# Patient Record
Sex: FEMALE | Race: BLACK OR AFRICAN AMERICAN | Employment: UNEMPLOYED | URBAN - METROPOLITAN AREA
[De-identification: names, ages, dates, MRNs, and addresses within clinical notes are randomized per-mention and may not be internally consistent; named-entity substitution may affect disease eponyms.]

---

## 2019-01-01 ENCOUNTER — APPOINTMENT (OUTPATIENT)
Dept: CT IMAGING | Age: 54
End: 2019-01-01
Attending: PHYSICIAN ASSISTANT
Payer: SELF-PAY

## 2019-01-01 ENCOUNTER — HOSPITAL ENCOUNTER (EMERGENCY)
Age: 54
Discharge: HOME | End: 2019-01-01
Attending: EMERGENCY MEDICINE
Payer: SELF-PAY

## 2019-01-01 VITALS
HEART RATE: 71 BPM | HEIGHT: 62 IN | OXYGEN SATURATION: 100 % | WEIGHT: 174.82 LBS | BODY MASS INDEX: 32.17 KG/M2 | RESPIRATION RATE: 16 BRPM | SYSTOLIC BLOOD PRESSURE: 153 MMHG | DIASTOLIC BLOOD PRESSURE: 110 MMHG | TEMPERATURE: 97.7 F

## 2019-01-01 DIAGNOSIS — Z98.890 HISTORY OF HERNIA REPAIR: ICD-10-CM

## 2019-01-01 DIAGNOSIS — A59.9 TRICHOMONIASIS: ICD-10-CM

## 2019-01-01 DIAGNOSIS — R10.2 PELVIC PAIN: Primary | ICD-10-CM

## 2019-01-01 DIAGNOSIS — D50.9 IRON DEFICIENCY ANEMIA, UNSPECIFIED IRON DEFICIENCY ANEMIA TYPE: ICD-10-CM

## 2019-01-01 DIAGNOSIS — Z87.19 HISTORY OF HERNIA REPAIR: ICD-10-CM

## 2019-01-01 DIAGNOSIS — R03.0 ELEVATED BLOOD PRESSURE READING: ICD-10-CM

## 2019-01-01 DIAGNOSIS — N83.202 CYST OF LEFT OVARY: ICD-10-CM

## 2019-01-01 DIAGNOSIS — Z20.2 POSSIBLE EXPOSURE TO STD: ICD-10-CM

## 2019-01-01 LAB
ANION GAP SERPL CALC-SCNC: 7 MMOL/L (ref 5–15)
APPEARANCE UR: CLEAR
BACTERIA URNS QL MICRO: ABNORMAL /HPF
BILIRUB UR QL: NEGATIVE
BUN SERPL-MCNC: 14 MG/DL (ref 6–20)
BUN/CREAT SERPL: 17 (ref 12–20)
CALCIUM SERPL-MCNC: 8.4 MG/DL (ref 8.5–10.1)
CHLORIDE SERPL-SCNC: 111 MMOL/L (ref 97–108)
CLUE CELLS VAG QL WET PREP: NORMAL
CO2 SERPL-SCNC: 25 MMOL/L (ref 21–32)
COLOR UR: ABNORMAL
CREAT SERPL-MCNC: 0.83 MG/DL (ref 0.55–1.02)
EPITH CASTS URNS QL MICRO: ABNORMAL /LPF
ERYTHROCYTE [DISTWIDTH] IN BLOOD BY AUTOMATED COUNT: 19.7 % (ref 11.5–14.5)
GLUCOSE SERPL-MCNC: 109 MG/DL (ref 65–100)
GLUCOSE UR STRIP.AUTO-MCNC: NEGATIVE MG/DL
HCG UR QL: NEGATIVE
HCT VFR BLD AUTO: 32.4 % (ref 35–47)
HGB BLD-MCNC: 10.2 G/DL (ref 11.5–16)
HGB UR QL STRIP: NEGATIVE
KETONES UR QL STRIP.AUTO: NEGATIVE MG/DL
KOH PREP SPEC: NORMAL
LEUKOCYTE ESTERASE UR QL STRIP.AUTO: NEGATIVE
LIPASE SERPL-CCNC: 102 U/L (ref 73–393)
MCH RBC QN AUTO: 23.9 PG (ref 26–34)
MCHC RBC AUTO-ENTMCNC: 31.5 G/DL (ref 30–36.5)
MCV RBC AUTO: 76.1 FL (ref 80–99)
NITRITE UR QL STRIP.AUTO: NEGATIVE
NRBC # BLD: 0 K/UL (ref 0–0.01)
NRBC BLD-RTO: 0 PER 100 WBC
PH UR STRIP: 6.5 [PH] (ref 5–8)
PLATELET # BLD AUTO: 313 K/UL (ref 150–400)
PMV BLD AUTO: 11 FL (ref 8.9–12.9)
POTASSIUM SERPL-SCNC: 3.9 MMOL/L (ref 3.5–5.1)
PROT UR STRIP-MCNC: NEGATIVE MG/DL
RBC # BLD AUTO: 4.26 M/UL (ref 3.8–5.2)
RBC #/AREA URNS HPF: ABNORMAL /HPF (ref 0–5)
SERVICE CMNT-IMP: NORMAL
SODIUM SERPL-SCNC: 143 MMOL/L (ref 136–145)
SP GR UR REFRACTOMETRY: 1.02 (ref 1–1.03)
T VAGINALIS VAG QL WET PREP: NORMAL
TRICHOMONAS UR QL MICRO: PRESENT
UA: UC IF INDICATED,UAUC: ABNORMAL
UROBILINOGEN UR QL STRIP.AUTO: 0.2 EU/DL (ref 0.2–1)
WBC # BLD AUTO: 3.3 K/UL (ref 3.6–11)
WBC URNS QL MICRO: ABNORMAL /HPF (ref 0–4)

## 2019-01-01 PROCEDURE — 87086 URINE CULTURE/COLONY COUNT: CPT

## 2019-01-01 PROCEDURE — 74011250636 HC RX REV CODE- 250/636: Performed by: EMERGENCY MEDICINE

## 2019-01-01 PROCEDURE — 85027 COMPLETE CBC AUTOMATED: CPT

## 2019-01-01 PROCEDURE — 99283 EMERGENCY DEPT VISIT LOW MDM: CPT

## 2019-01-01 PROCEDURE — 87491 CHLMYD TRACH DNA AMP PROBE: CPT

## 2019-01-01 PROCEDURE — 87210 SMEAR WET MOUNT SALINE/INK: CPT

## 2019-01-01 PROCEDURE — 80048 BASIC METABOLIC PNL TOTAL CA: CPT

## 2019-01-01 PROCEDURE — 83690 ASSAY OF LIPASE: CPT

## 2019-01-01 PROCEDURE — 74011636320 HC RX REV CODE- 636/320: Performed by: EMERGENCY MEDICINE

## 2019-01-01 PROCEDURE — 81001 URINALYSIS AUTO W/SCOPE: CPT

## 2019-01-01 PROCEDURE — 74177 CT ABD & PELVIS W/CONTRAST: CPT

## 2019-01-01 PROCEDURE — 36415 COLL VENOUS BLD VENIPUNCTURE: CPT

## 2019-01-01 PROCEDURE — 81025 URINE PREGNANCY TEST: CPT

## 2019-01-01 PROCEDURE — 74011250637 HC RX REV CODE- 250/637: Performed by: PHYSICIAN ASSISTANT

## 2019-01-01 RX ORDER — DICYCLOMINE HYDROCHLORIDE 20 MG/1
20 TABLET ORAL EVERY 6 HOURS
Qty: 20 TAB | Refills: 0 | Status: SHIPPED | OUTPATIENT
Start: 2019-01-01 | End: 2019-01-06

## 2019-01-01 RX ORDER — DOXYCYCLINE HYCLATE 100 MG
100 TABLET ORAL 2 TIMES DAILY
Qty: 14 TAB | Refills: 0 | Status: SHIPPED | OUTPATIENT
Start: 2019-01-01 | End: 2019-01-08

## 2019-01-01 RX ORDER — SODIUM CHLORIDE 0.9 % (FLUSH) 0.9 %
10 SYRINGE (ML) INJECTION
Status: COMPLETED | OUTPATIENT
Start: 2019-01-01 | End: 2019-01-01

## 2019-01-01 RX ORDER — SODIUM CHLORIDE 9 MG/ML
50 INJECTION, SOLUTION INTRAVENOUS
Status: COMPLETED | OUTPATIENT
Start: 2019-01-01 | End: 2019-01-01

## 2019-01-01 RX ORDER — METRONIDAZOLE 250 MG/1
2000 TABLET ORAL
Status: COMPLETED | OUTPATIENT
Start: 2019-01-01 | End: 2019-01-01

## 2019-01-01 RX ADMIN — IOPAMIDOL 100 ML: 755 INJECTION, SOLUTION INTRAVENOUS at 13:59

## 2019-01-01 RX ADMIN — Medication 10 ML: at 13:59

## 2019-01-01 RX ADMIN — SODIUM CHLORIDE 50 ML/HR: 900 INJECTION, SOLUTION INTRAVENOUS at 13:59

## 2019-01-01 RX ADMIN — METRONIDAZOLE 2000 MG: 250 TABLET ORAL at 14:20

## 2019-01-01 NOTE — ED PROVIDER NOTES
EMERGENCY DEPARTMENT HISTORY AND PHYSICAL EXAM 
 
 
Date: 1/1/2019 Patient Name: Christopher Cabrales History of Presenting Illness Chief Complaint Patient presents with  Vaginal Bleeding Pt reoprts onset of irregular vaginal bleeding over the past 4 weeks with lower abdominal pain. History Provided By: Patient HPI: Christopher Cabrales, 47 y.o. female with PMHx significant for HTN and \"ruptured hernia\" requiring emergent surgery two years ago, presents ambulatry to the ED with cc of diffuse abdominal pain x 1 month with associated vaginal bleeding. Pt states that her bleeding began the day after Thanksgiving and lasted one week, then stopped x 4 days, but then resumed. She states that she is sometimes spotting and other times it is heavy bleeding. She is using about four pads daily. She states that her bleeding stopped four days ago and she has not experienced recurrence. She states that she has not yet gone through menopause yet. Her mother went through menopause around 61years old. Pt reports that her abdominal pain is in the middle and lower areas. She denies any urinary symptoms. She reports unprotected sexual intercourse with her fiancee. She denies vaginal discharge, diarrhea, N/V, hemoptysis, melena, hematochezia. PCP: Ric Nicole MD 
 
There are no other complaints, changes, or physical findings at this time. Current Outpatient Medications Medication Sig Dispense Refill  doxycycline (VIBRA-TABS) 100 mg tablet Take 1 Tab by mouth two (2) times a day for 7 days. 14 Tab 0  
 dicyclomine (BENTYL) 20 mg tablet Take 1 Tab by mouth every six (6) hours for 20 doses. 20 Tab 0  
 ciprofloxacin HCl (CILOXAN) 0.3 % ophthalmic solution Administer 1 drop to both eyes four (4) times daily. 2.5 mL 0  
 FEXOFENADINE HCL (ALLEGRA PO) Take  by mouth daily.     
 ferrous sulfate (IRON) 325 mg (65 mg elemental iron) tablet Take  by mouth three (3) times daily (with meals). Past History Past Medical History: 
Past Medical History:  
Diagnosis Date  Elevated blood pressure  Heart murmur Past Surgical History: 
Past Surgical History:  
Procedure Laterality Date [facility]    
 2011  HX  SECTION    
 HX HEENT    
 sinus surgery  HX ORTHOPAEDIC    
 left ring finger trigger release  HX WISDOM TEETH EXTRACTION Family History: 
Family History Problem Relation Age of Onset  Hypertension Mother  Stroke Mother  Cancer Maternal Grandmother Breast Cancer  Diabetes Maternal Grandmother  Diabetes Paternal Grandmother Social History: 
Social History Tobacco Use  Smoking status: Never Smoker  Smokeless tobacco: Never Used Substance Use Topics  Alcohol use: Yes  Drug use: No  
 
 
Allergies: Allergies Allergen Reactions  Latex, Natural Rubber Anaphylaxis  Dolobid [Diflunisal] Anaphylaxis  Penicillin G Anaphylaxis Review of Systems Review of Systems Constitutional: Negative. Negative for activity change, appetite change, chills and fever. Eyes: Negative for pain and visual disturbance. Respiratory: Negative for cough and shortness of breath. Cardiovascular: Negative for chest pain and palpitations. Gastrointestinal: Positive for abdominal pain (diffuse). Negative for diarrhea, nausea and vomiting. Genitourinary: Positive for vaginal bleeding (now resolved). Negative for dysuria and hematuria. Musculoskeletal: Negative for arthralgias and myalgias. Skin: Negative for color change, rash and wound. Neurological: Negative for dizziness and headaches. All other systems reviewed and are negative. Physical Exam  
Physical Exam  
Constitutional: She is oriented to person, place, and time. She appears well-developed and well-nourished. No distress. 47 y.o.   female in NAD 
 Communicates appropriately and in full sentences HENT:  
Head: Normocephalic and atraumatic. Mouth/Throat: Oropharynx is clear and moist.  
Eyes: Conjunctivae are normal. Pupils are equal, round, and reactive to light. Right eye exhibits no discharge. Left eye exhibits no discharge. Neck: Normal range of motion. Neck supple. No nuchal rigidity or meningeal signs Cardiovascular: Normal rate, regular rhythm and intact distal pulses. Pulmonary/Chest: Effort normal and breath sounds normal. No respiratory distress. Abdominal: Soft. She exhibits no distension. There is tenderness (diffuse). There is no rebound and no guarding. Genitourinary:  
Genitourinary Comments: Defers pelvic exam and prefers to self swab Musculoskeletal: Normal range of motion. She exhibits no edema, tenderness or deformity. No neurologic, motor, vascular, or compartment embarrassment observed on exam. No focal neurologic deficits. Neurological: She is alert and oriented to person, place, and time. Skin: Skin is warm and dry. No rash noted. She is not diaphoretic. No erythema. No pallor. Psychiatric: She has a normal mood and affect. Her behavior is normal.  
Nursing note and vitals reviewed. Diagnostic Study Results Labs - No results found for this or any previous visit (from the past 12 hour(s)). Radiologic Studies -  
CT ABD PELV W CONT Final Result IMPRESSION: No bowel obstruction, ileus or perforation. No intraabdominal  
abscess. CT Results  (Last 48 hours) 01/01/19 1359  CT ABD PELV W CONT Final result Impression:  IMPRESSION: No bowel obstruction, ileus or perforation. No intraabdominal  
abscess. Narrative:  INDICATION: Vaginal bleeding for 4 weeks, 3-4 days ago. Lower abdominal pain. CT of the abdomen and pelvis is performed with 5 mm collimation. Study is  
performed with 100 cc of nonionic Isovue 370. Sagittal and coronal reformatted images were also performed. CT dose reduction was achieved with the use of the standardized protocol  
tailored for this examination and automatic exposure control for dose  
modulation. There is no prior study for direct comparison. Findings:  
   
Lung bases: There is very mild atelectasis within the medial lung bases. Liver: The liver is normal.  
   
Adrenals: Adrenal glands are normal.  
   
Pancreas: The pancreas is normal.  
   
Gallbladder: The gallbladder is normal.  
   
Kidneys: There is multifocal right renal cortical scarring. Kidneys otherwise  
enhance prominent symmetrically. There is no hydronephrosis. Spleen: The spleen is normal.  
   
Lymph nodes. There is no dayne hepatitis, mesenteric, retroperitoneal or pelvic  
lymphadenopathy. Bowel: No thickened or dilated loop of large or small bowel is visualized. Appendix: The appendix is not visualized, however there are no secondary signs  
of acute appendicitis. Urinary bladder: Urinary bladder is partially filled and grossly normal.  
   
Miscellaneous: Within the right upper pelvis, there is a 2.7 cm x 2.6 cm rounded  
thin-walled cystic lesion, likely arising from the right ovary. There is no free  
intraperitoneal fluid or gas. There is no focal fluid collection to suggest  
abscess. Bilateral tubal ligation clips are noted. Medical Decision Making I am the first provider for this patient. I reviewed the vital signs, available nursing notes, past medical history, past surgical history, family history and social history. Vital Signs-Reviewed the patient's vital signs. No data found. Records Reviewed: Nursing Notes and Old Medical Records Provider Notes (Medical Decision Making): DDx: UTI, hernia, menopause, dysmenorrhea, hormone changes, trichomonas, BV, GC/CT 
 
ED Course:  
Initial assessment performed.  The patients presenting problems have been discussed, and they are in agreement with the care plan formulated and outlined with them. I have encouraged them to ask questions as they arise throughout their visit. HYPERTENSION COUNSELING Patient denies chest pain, headache, shortness of breath. Patient is made aware of their elevated blood pressure and is instructed to follow up this week with their PCP for a blood pressure recheck. Patient is counseled regarding consequences of chronic, uncontrolled hypertension including kidney disease, vision disturbances, heart disease, stroke or even death. Patient states their understanding and agrees to follow up this week. DISCHARGE NOTE: 
Kwesi Abernathy's  results have been reviewed with her. She has been counseled regarding her diagnosis. She verbally conveys understanding and agreement of the signs, symptoms, diagnosis, treatment and prognosis and additionally agrees to follow up as recommended with Dr. Raj Salinas MD in 24 - 48 hours. She also agrees with the care-plan and conveys that all of her questions have been answered. I have also put together some discharge instructions for her that include: 1) educational information regarding their diagnosis, 2) how to care for their diagnosis at home, as well a 3) list of reasons why they would want to return to the ED prior to their follow-up appointment, should their condition change. She and/or family's questions have been answered. I have encouraged them to see the official results in [facility]\" or to retrieve the specifics of their results from medical records. PLAN: 
1. Return precautions as discussed 2. Follow-up with providers as directed 3. Medications as prescribed Return to ED if worse Diagnosis Clinical Impression: 1. Pelvic pain 2. Elevated blood pressure reading 3. History of hernia repair 4. Trichomoniasis 5.  Possible exposure to STD   
 6. Iron deficiency anemia, unspecified iron deficiency anemia type 7. Cyst of left ovary Discharge Medication List as of 1/1/2019  3:26 PM  
  
START taking these medications Details  
doxycycline (VIBRA-TABS) 100 mg tablet Take 1 Tab by mouth two (2) times a day for 7 days. , Normal, Disp-14 Tab, R-0  
  
dicyclomine (BENTYL) 20 mg tablet Take 1 Tab by mouth every six (6) hours for 20 doses. , Normal, Disp-20 Tab, R-0  
  
  
CONTINUE these medications which have NOT CHANGED Details  
ciprofloxacin HCl (CILOXAN) 0.3 % ophthalmic solution Administer 1 drop to both eyes four (4) times daily. , Print, Disp-2.5 mL, R-0 FEXOFENADINE HCL (ALLEGRA PO) Take  by mouth daily. Historical Med  
  
ferrous sulfate (IRON) 325 mg (65 mg elemental iron) tablet Take  by mouth three (3) times daily (with meals). Historical Med Follow-up Information Follow up With Specialties Details Why Contact Info Daniel Correa MD Family Practice Schedule an appointment as soon as possible for a visit in 2 days As needed, If symptoms worsen [address] [facility] [id number] [phone] [facility] EMERGENCY DEPT Emergency Medicine Go to If symptoms worsen [address] [facility] 
[phone] Mariola Eason MD Obstetrics & Gynecology, Gynecology, Obstetrics Call today  [address] [facility] 
[phone] This note will not be viewable in [address]

## 2019-01-01 NOTE — LETTER
[address] 
[facility] EMERGENCY DEPT 
[address] [place] [address] [address] [id number] [phone] Work/School Note Date: 1/1/2019 To Whom It May concern: 
 
Radha Guerrier was seen and treated today in the emergency room by the following provider(s): 
Attending Provider: Scarlett Dave MD 
Physician Assistant: DARVIN Sexton. Radha Guerrier may return to work on 1/3/2019. Sincerely, 
 
 
 
 
Dora Beckford.  [address] [place] [address]

## 2019-01-01 NOTE — ED NOTES
Discharge paperwork given to pt by provider. No acute changes noted to pt. Pt stable and ambulatory upon discharge.

## 2019-01-01 NOTE — ED NOTES
Pt presents with c/o mid abdominal pain radiating to lower back. Denies nausea/vomiting. Pt also states she has had vaginal bleeding since thanksgiving and bleeding stopped 3-4 days ago. Pt currently denies dizziness, SOB.

## 2019-01-01 NOTE — DISCHARGE INSTRUCTIONS
Iron Deficiency Anemia: Care Instructions  Your Care Instructions    Anemia means that you do not have enough red blood cells. Red blood cells carry oxygen around your body. When you have anemia, it can make you pale, weak, and tired. Many things can cause anemia. The most common cause is loss of blood. This can happen if you have heavy menstrual periods. It can also happen if you have bleeding in your stomach or bowel. You can also get anemia if you don't have enough iron in your diet or if it's hard for your body to absorb iron. In some cases, pregnancy causes anemia. That's because a pregnant woman needs more iron. Your doctor may do more tests to find the cause of your anemia. If a disease or other health problem is causing it, your doctor will treat that problem. It's important to follow up with your doctor to make sure that your iron level returns to normal.  Follow-up care is a key part of your treatment and safety. Be sure to make and go to all appointments, and call your doctor if you are having problems. It's also a good idea to know your test results and keep a list of the medicines you take. How can you care for yourself at home? · If your doctor recommended iron pills, take them as directed. ? Try to take the pills on an empty stomach. You can do this about 1 hour before or 2 hours after meals. But you may need to take iron with food to avoid an upset stomach. ? Do not take antacids or drink milk or anything with caffeine within 2 hours of when you take your iron. They can keep your body from absorbing the iron well. ? Vitamin C helps your body absorb iron. You may want to take iron pills with a glass of orange juice or some other food high in vitamin C.  ? Iron pills may cause stomach problems. These include heartburn, nausea, diarrhea, constipation, and cramps. It can help to drink plenty of fluids and include fruits, vegetables, and fiber in your diet.   ? It's normal for iron pills to make your stool a greenish or grayish black. But internal bleeding can also cause dark stool. So it's important to tell your doctor about any color changes. ? Call your doctor if you think you are having a problem with your iron pills. Even after you start to feel better, it will take several months for your body to build up its supply of iron. ? If you miss a pill, don't take a double dose. ? Keep iron pills out of the reach of small children. Too much iron can be very dangerous. · Eat foods with a lot of iron. These include red meat, shellfish, poultry, and eggs. They also include beans, raisins, whole-grain bread, and leafy green vegetables. · Steam your vegetables. This is the best way to prepare them if you want to get as much iron as possible. · Be safe with medicines. Do not take nonsteroidal anti-inflammatory pain relievers unless your doctor tells you to. These include aspirin, naproxen (Aleve), and ibuprofen (Advil, Motrin). · Liquid iron can stain your teeth. But you can mix it with water or juice and drink it with a straw. Then it won't get on your teeth. When should you call for help? Call 911 anytime you think you may need emergency care. For example, call if:    · You passed out (lost consciousness).    Call your doctor now or seek immediate medical care if:    · You are short of breath.     · You are dizzy or light-headed, or you feel like you may faint.     · You have new or worse bleeding.    Watch closely for changes in your health, and be sure to contact your doctor if:    · You feel weaker or more tired than usual.     · You do not get better as expected. Where can you learn more? Go to http://serge-delta.info/. Enter H583 in the search box to learn more about \"Iron Deficiency Anemia: Care Instructions. \"  Current as of: May 7, 2018  Content Version: 11.8  © 1243-0617 [employer].  Care instructions adapted under license by [employer] (which disclaims liability or warranty for this information). If you have questions about a medical condition or this instruction, always ask your healthcare professional. [address] any warranty or liability for your use of this information. Elevated Blood Pressure: Care Instructions  Your Care Instructions    Blood pressure is a measure of how hard the blood pushes against the walls of your arteries. It's normal for blood pressure to go up and down throughout the day. But if it stays up over time, you have high blood pressure. Two numbers tell you your blood pressure. The first number is the systolic pressure. It shows how hard the blood pushes when your heart is pumping. The second number is the diastolic pressure. It shows how hard the blood pushes between heartbeats, when your heart is relaxed and filling with blood. An ideal blood pressure in adults is less than 120/80 (say \"120 over 80\"). High blood pressure is 140/90 or higher. You have high blood pressure if your top number is 140 or higher or your bottom number is 90 or higher, or both. The main test for high blood pressure is simple, fast, and painless. To diagnose high blood pressure, your doctor will test your blood pressure at different times. After testing your blood pressure, your doctor may ask you to test it again when you are home. If you are diagnosed with high blood pressure, you can work with your doctor to make a long-term plan to manage it. Follow-up care is a key part of your treatment and safety. Be sure to make and go to all appointments, and call your doctor if you are having problems. It's also a good idea to know your test results and keep a list of the medicines you take. How can you care for yourself at home? · Do not smoke. Smoking increases your risk for heart attack and stroke. If you need help quitting, talk to your doctor about stop-smoking programs and medicines.  These can increase your chances of quitting for good. · Stay at a healthy weight. · Try to limit how much sodium you eat to less than 2,300 milligrams (mg) a day. Your doctor may ask you to try to eat less than 1,500 mg a day. · Be physically active. Get at least 30 minutes of exercise on most days of the week. Walking is a good choice. You also may want to do other activities, such as running, swimming, cycling, or playing tennis or team sports. · Avoid or limit alcohol. Talk to your doctor about whether you can drink any alcohol. · Eat plenty of fruits, vegetables, and low-fat dairy products. Eat less saturated and total fats. · Learn how to check your blood pressure at home. When should you call for help? Call your doctor now or seek immediate medical care if:  ? · Your blood pressure is much higher than normal (such as 180/110 or higher). ? · You think high blood pressure is causing symptoms such as:  ¨ Severe headache. ¨ Blurry vision. ? Watch closely for changes in your health, and be sure to contact your doctor if:  ? · You do not get better as expected. Where can you learn more? Go to http://serge-delta.info/. Enter V733 in the search box to learn more about \"Elevated Blood Pressure: Care Instructions. \"  Current as of: September 21, 2016  Content Version: 11.4  © 3091-0791 [employer]. Care instructions adapted under license by [employer] (which disclaims liability or warranty for this information). If you have questions about a medical condition or this instruction, always ask your healthcare professional. [place] [address] any warranty or liability for your use of this information. Iron-Rich Diet: Care Instructions  Your Care Instructions    Your body needs iron to make hemoglobin. Hemoglobin is a substance in red blood cells that carries oxygen from the lungs to cells all through your body.  If you do not get enough iron, your body makes fewer and smaller red blood cells. As a result, your body's cells may not get enough oxygen. Adult men need 8 milligrams of iron a day; adult women need 18 milligrams of iron a day. After menopause, women need 8 milligrams of iron a day. A pregnant woman needs 27 milligrams of iron a day. Infants and young children have higher iron needs relative to their size than other age groups. People who have lost blood because of ulcers or heavy menstrual periods may become very low in iron and may develop anemia. Most people can get the iron their bodies need by eating enough of certain iron-rich foods. Your doctor may recommend that you take an iron supplement along with eating an iron-rich diet. Follow-up care is a key part of your treatment and safety. Be sure to make and go to all appointments, and call your doctor if you are having problems. It's also a good idea to know your test results and keep a list of the medicines you take. How can you care for yourself at home? · Make iron-rich foods a part of your daily diet. Iron-rich foods include:  ? All meats, such as chicken, beef, lamb, pork, fish, and shellfish. Liver is especially high in iron. ? Leafy green vegetables. ? Raisins, peas, beans, lentils, barley, and eggs. ? Iron-fortified breakfast cereals. · Eat foods with vitamin C along with iron-rich foods. Vitamin C helps you absorb more iron from food. Drink a glass of orange juice or another citrus juice with your food. · Eat meat and vegetables or grains together. The iron in meat helps your body absorb the iron in other foods. Where can you learn more? Go to http://serge-delta.info/. Enter [id number] in the search box to learn more about \"Iron-Rich Diet: Care Instructions. \"  Current as of: March 29, 2018  Content Version: 11.8  © 9209-7554 [employer]. Care instructions adapted under license by [employer] (which disclaims liability or warranty for this information). If you have questions about a medical condition or this instruction, always ask your healthcare professional. [address] any warranty or liability for your use of this information. Pelvic Pain: Care Instructions  Your Care Instructions    Pelvic pain, or pain in the lower belly, can have many causes. Often pelvic pain is not serious and gets better in a few days. If your pain continues or gets worse, you may need tests and treatment. Tell your doctor about any new symptoms. These may be signs of a serious problem. Follow-up care is a key part of your treatment and safety. Be sure to make and go to all appointments, and call your doctor if you are having problems. It's also a good idea to know your test results and keep a list of the medicines you take. How can you care for yourself at home? · Rest until you feel better. Lie down, and raise your legs by placing a pillow under your knees. · Drink plenty of fluids. You may find that small, frequent sips are easier on your stomach than if you drink a lot at once. Avoid drinks with carbonation or caffeine, such as soda pop, tea, or coffee. · Try eating several small meals instead of 2 or 3 large ones. Eat mild foods, such as rice, dry toast or crackers, bananas, and applesauce. Avoid fatty and spicy foods, other fruits, and alcohol until 48 hours after your symptoms have gone away. · Take an over-the-counter pain medicine, such as acetaminophen (Tylenol), ibuprofen (Advil, Motrin), or naproxen (Aleve). Read and follow all instructions on the label. · Do not take two or more pain medicines at the same time unless the doctor told you to. Many pain medicines have acetaminophen, which is Tylenol. Too much acetaminophen (Tylenol) can be harmful. · You can put a heating pad, a warm cloth, or moist heat on your belly to relieve pain. When should you call for help?   Call your doctor now or seek immediate medical care if:    · You have a new or higher fever.     · You have unusual vaginal bleeding.     · You have new or worse belly or pelvic pain.     · You have vaginal discharge that has increased in amount or smells bad.    Watch closely for changes in your health, and be sure to contact your doctor if:    · You do not get better as expected. Where can you learn more? Go to http://serge-delta.info/. Enter [id number] in the search box to learn more about \"Pelvic Pain: Care Instructions. \"  Current as of: May 15, 2018  Content Version: 11.8  © 2212-8057 [employer]. Care instructions adapted under license by [employer] (which disclaims liability or warranty for this information). If you have questions about a medical condition or this instruction, always ask your healthcare professional. [address] any warranty or liability for your use of this information. Exposure to Sexually Transmitted Infections: Care Instructions  Your Care Instructions  Sexually transmitted infections (STIs) are those diseases spread by sexual contact. There are at least 20 different STIs, including chlamydia, gonorrhea, syphilis, and human immunodeficiency virus (HIV), which causes AIDS. Bacteria-caused STIs can be treated and cured. STIs caused by viruses, such as HIV, can be treated but not cured. Some STIs can reduce a woman's chances of getting pregnant in the future. STIs are spread during sexual contact, such as vaginal intercourse and oral or anal sex. Follow-up care is a key part of your treatment and safety. Be sure to make and go to all appointments, and call your doctor if you are having problems. It's also a good idea to know your test results and keep a list of the medicines you take. How can you care for yourself at home? · Your doctor may have given you a shot of antibiotics. If your doctor prescribed antibiotic pills, take them as directed.  Do not stop taking them just because you feel better. You need to take the full course of antibiotics. · Do not have sexual contact while you have symptoms of an STI or are being treated for an STI. · Tell your sex partner (or partners) that he or she will need treatment. · If you are a woman, do not douche. Douching changes the normal balance of bacteria in the vagina and may spread an infection up into your reproductive organs. To prevent exposure to STIs in the future  · Use latex condoms every time you have sex. Use them from the beginning to the end of sexual contact. · Talk to your partner before you have sex. Find out if he or she has or is at risk for any STI. Keep in mind that a person may be able to spread an STI even if he or she does not have symptoms. · Do not have sex if you are being treated for an STI. · Do not have sex with anyone who has symptoms of an STI, such as sores on the genitals or mouth. · Having one sex partner (who does not have STIs and does not have sex with anyone else) is a good way to avoid STIs. When should you call for help? Call your doctor now or seek immediate medical care if:    · You have new pain in your belly or pelvis.     · You have symptoms of a urinary tract infection. These may include:  ? Pain or burning when you urinate. ? A frequent need to urinate without being able to pass much urine. ? Pain in the flank, which is just below the rib cage and above the waist on either side of the back. ? Blood in your urine. ? A fever.     · You have new or worsening pain or swelling in the scrotum.    Watch closely for changes in your health, and be sure to contact your doctor if:    · You have unusual vaginal bleeding.     · You have a discharge from the vagina or penis.     · You have any new symptoms, such as sores, bumps, rashes, blisters, or warts.     · You have itching, tingling, pain, or burning in the genital or anal area.     · You think you may have an STI. Where can you learn more?   Go to http://serge-delta.info/. Enter O362 in the search box to learn more about \"Exposure to Sexually Transmitted Infections: Care Instructions. \"  Current as of: November 27, 2017  Content Version: 11.8  © 0415-2245 [employer]. Care instructions adapted under license by [employer] (which disclaims liability or warranty for this information). If you have questions about a medical condition or this instruction, always ask your healthcare professional. [address] any warranty or liability for your use of this information. Trichomoniasis: Care Instructions  Your Care Instructions  Trichomoniasis is a sexually transmitted infection (STI) that is spread by having sex with an infected partner. Trichomoniasis is commonly called trich (say \"trick\"). In women, trich may cause vaginal itching and a smelly discharge. But in many cases, especially in men, there are no symptoms. Giuliana Beverly is treated so that you do not spread it to others. Both you and your sex partner or partners should be treated at the same time so you do not infect each other again. Trich may cause problems with pregnancy. Your doctor will talk with you about treatment for Trich if you are pregnant. Follow-up care is a key part of your treatment and safety. Be sure to make and go to all appointments, and call your doctor if you are having problems. It's also a good idea to know your test results and keep a list of the medicines you take. How can you care for yourself at home? · Take your antibiotics as directed. Do not stop taking them just because you feel better. You need to take the full course of antibiotics. · Do not have sex while you are being treated. If your doctor gave you a single dose of antibiotics, do not have sex for one week after being treated and until your partner also has been treated.   · Tell your sex partner (or partners) that he or she will also need to be tested and treated. · Use a cold water compress or cool baths to relieve itching. To prevent trichomoniasis in the future  · Use latex condoms every time you have sex. Use them from the beginning to the end of sexual contact. · Talk to your partner before having sex. Find out if he or she has or is at risk for trich or any other STI. Keep in mind that a person may be able to spread an STI even if he or she does not have symptoms. · Do not have sex if you are being treated for trich or any other STI. · Do not have sex with anyone who has symptoms of an STI, such as sores on the genitals or mouth. · Having one sex partner (who does not have STIs and does not have sex with anyone else) is a good way to avoid STIs. When should you call for help? Call your doctor now or seek immediate medical care if:    · You have unusual vaginal bleeding.     · You have a fever.     · You have new discharge from the vagina or penis.     · You have pelvic pain.    Watch closely for changes in your health, and be sure to contact your doctor if:    · You do not get better as expected.     · You have any new symptoms or your symptoms get worse. Where can you learn more? Go to http://serge-delta.info/. Enter S580 in the search box to learn more about \"Trichomoniasis: Care Instructions. \"  Current as of: November 27, 2017  Content Version: 11.8  © 0315-1277 [employer]. Care instructions adapted under license by [employer] (which disclaims liability or warranty for this information). If you have questions about a medical condition or this instruction, always ask your healthcare professional. [address] any warranty or liability for your use of this information.

## 2019-01-03 LAB
BACTERIA SPEC CULT: NORMAL
CC UR VC: NORMAL
SERVICE CMNT-IMP: NORMAL

## 2019-01-04 LAB
C TRACH DNA SPEC QL NAA+PROBE: NEGATIVE
N GONORRHOEA DNA SPEC QL NAA+PROBE: NEGATIVE
SAMPLE TYPE: NORMAL
SERVICE CMNT-IMP: NORMAL
SPECIMEN SOURCE: NORMAL

## 2020-11-27 ENCOUNTER — HOSPITAL ENCOUNTER (OUTPATIENT)
Dept: LAB | Age: 55
Discharge: HOME | End: 2020-11-27

## 2020-11-27 ENCOUNTER — HOSPITAL ENCOUNTER (OUTPATIENT)
Dept: LAB | Age: 55
Discharge: HOME | End: 2020-11-27
Payer: COMMERCIAL

## 2020-11-27 ENCOUNTER — HOSPITAL ENCOUNTER (EMERGENCY)
Age: 55
Discharge: HOME | End: 2020-11-27
Attending: EMERGENCY MEDICINE | Admitting: EMERGENCY MEDICINE
Payer: COMMERCIAL

## 2020-11-27 VITALS
BODY MASS INDEX: 32.7 KG/M2 | HEIGHT: 62 IN | OXYGEN SATURATION: 100 % | RESPIRATION RATE: 16 BRPM | DIASTOLIC BLOOD PRESSURE: 113 MMHG | TEMPERATURE: 98.3 F | HEART RATE: 107 BPM | WEIGHT: 177.69 LBS | SYSTOLIC BLOOD PRESSURE: 143 MMHG

## 2020-11-27 DIAGNOSIS — H16.002 CORNEA ULCER, LEFT: ICD-10-CM

## 2020-11-27 DIAGNOSIS — H16.9 KERATITIS, RIGHT: ICD-10-CM

## 2020-11-27 DIAGNOSIS — H16.9 KERATITIS, LEFT: Primary | ICD-10-CM

## 2020-11-27 DIAGNOSIS — H16.001 CORNEA ULCER, RIGHT: ICD-10-CM

## 2020-11-27 PROCEDURE — 99284 EMERGENCY DEPT VISIT MOD MDM: CPT

## 2020-11-27 PROCEDURE — 87102 FUNGUS ISOLATION CULTURE: CPT

## 2020-11-27 PROCEDURE — 87070 CULTURE OTHR SPECIMN AEROBIC: CPT

## 2020-11-27 PROCEDURE — 87186 SC STD MICRODIL/AGAR DIL: CPT

## 2020-11-27 PROCEDURE — 74011250637 HC RX REV CODE- 250/637: Performed by: EMERGENCY MEDICINE

## 2020-11-27 PROCEDURE — 87077 CULTURE AEROBIC IDENTIFY: CPT

## 2020-11-27 RX ORDER — IBUPROFEN 400 MG/1
800 TABLET ORAL ONCE
Status: COMPLETED | OUTPATIENT
Start: 2020-11-27 | End: 2020-11-27

## 2020-11-27 RX ORDER — IBUPROFEN 600 MG/1
600 TABLET ORAL
Qty: 20 TAB | Refills: 0 | Status: SHIPPED | OUTPATIENT
Start: 2020-11-27

## 2020-11-27 RX ADMIN — IBUPROFEN 800 MG: 400 TABLET, FILM COATED ORAL at 10:13

## 2020-11-27 NOTE — LETTER
[address] 
[facility] EMERGENCY DEPT 
[address] [facility] [place] [address] 
[phone] Work/School Note Date: 11/27/2020 To Whom It May concern: 
 
Kunal Vela was seen and treated today in the emergency room by the following provider(s): 
Attending Provider: Saulo Romero MD.   
 
Kunal Vela may return to work on Monday, November 30. She was seen in the emergency department on Friday for a severe eye infection of both eyes which is under treatment by an eye specialist.  Please excuse from work Friday and Saturday and Sunday. She can most likely return to work on Monday without restrictions.  
 
Sincerely, 
 
 
 
 
Nini Pulido MD

## 2020-11-27 NOTE — DISCHARGE INSTRUCTIONS
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 This is a serious eye condition, and needs to be treated immediately with antibiotics, and need to be seen by an ophthalmologist.  Dr. Lisa Umanzor can see you in his office at 3 PM today at [facility] at the [facility] main office. He will prescribe antibiotics for you and examine you. In the meantime take extra strength ibuprofen every 4-6 hours as needed for pain.

## 2020-11-27 NOTE — ED PROVIDER NOTES
EMERGENCY DEPARTMENT HISTORY AND PHYSICAL EXAM      Date: 2020  Patient Name: Kaylen Larsen  Patient Age and Sex: 48 y.o. female    History of Presenting Illness     Chief Complaint   Patient presents with    Concern For COVID-19 (Coronavirus)     exposure to \"someone in the store\" not feeling well for five days    Eye Pain     sensitive to eye with swelling and watering pain is very intense for two day; hard to look a fluorescent lights ; she took Kwadwo-Shayne"       History Provided By: Patient    Ability to gather history was limited by:     HPI: Kaylen Larsen, 48 y.o. female complains of left eye pain for the past 3 to 5 days, severe, burning in nature. Left eye has been red. She has also had irritation and swelling to the bilateral upper eyelids. Of note she does wear false eyelashes at times. She also wears contact lenses, and is currently wearing them here in the emergency department. No history of uveitis or glaucoma. She endorses photophobia to bright lights in the past few days. No significant headache or sinus pain. No significant discharge from the eye. Location:    Quality:      Severity:    Duration:   Timing:      Context:    Modifying factors:   Associated symptoms:       The patient's medical, surgical, family, and social history on file were reviewed by me today.       Past Medical History:   Diagnosis Date    Elevated blood pressure     Heart murmur      Past Surgical History:   Procedure Laterality Date    BLOOD TRANSFUSION SERVICE          HX  SECTION      HX HEENT      sinus surgery    HX ORTHOPAEDIC      left ring finger trigger release    HX WISDOM TEETH EXTRACTION         PCP: None    Past History     Past Medical History:  Past Medical History:   Diagnosis Date    Elevated blood pressure     Heart murmur        Past Surgical History:  Past Surgical History:   Procedure Laterality Date    BLOOD TRANSFUSION SERVICE          HX  SECTION      HX HEENT      sinus surgery    HX ORTHOPAEDIC      left ring finger trigger release    HX WISDOM TEETH EXTRACTION         Family History:  Family History   Problem Relation Age of Onset    Hypertension Mother     Stroke Mother     Cancer Maternal Grandmother         Breast Cancer    Diabetes Maternal Grandmother     Diabetes Paternal Grandmother        Social History:  Social History     Tobacco Use    Smoking status: Never Smoker    Smokeless tobacco: Never Used   Substance Use Topics    Alcohol use: Yes    Drug use: No       Allergies: Allergies   Allergen Reactions    Latex, Natural Rubber Anaphylaxis    Dolobid [Diflunisal] Anaphylaxis    Penicillin G Anaphylaxis       Current Medications:  No current facility-administered medications on file prior to encounter. Current Outpatient Medications on File Prior to Encounter   Medication Sig Dispense Refill    ciprofloxacin HCl (CILOXAN) 0.3 % ophthalmic solution Administer 1 drop to both eyes four (4) times daily. 2.5 mL 0    FEXOFENADINE HCL (ALLEGRA PO) Take  by mouth daily.  ferrous sulfate (IRON) 325 mg (65 mg elemental iron) tablet Take  by mouth three (3) times daily (with meals). Review of Systems   Review of Systems   Constitutional: Negative for fatigue and fever. Eyes: Positive for photophobia, pain and redness. Negative for discharge and visual disturbance. All other systems reviewed and are negative. Physical Exam   Vital Signs  Patient Vitals for the past 8 hrs:   Temp Pulse Resp BP SpO2   11/27/20 0921 98.3 °F (36.8 °C) (!) 107 16 (!) 143/113 100 %          Physical Exam  Vitals signs reviewed. Constitutional:       General: She is not in acute distress. Appearance: She is not ill-appearing. Eyes:      Extraocular Movements:      Right eye: Normal extraocular motion. Left eye: Normal extraocular motion. Conjunctiva/sclera:      Left eye: Left conjunctiva is injected.  No chemosis, exudate or hemorrhage. Pupils: Pupils are equal, round, and reactive to light. Pupils are equal.      Right eye: Fluorescein uptake present. Left eye: Fluorescein uptake present. Comments: Patient initially wearing colored contact lenses    Diffuse conjunctivitis left eye    Bilateral upper eyelids with mild edema and skin irritation    =============  Bilateral corneal ulcers and diffuse corneal haziness on fluorescein exam   Skin:     General: Skin is warm and dry. Findings: No rash. Neurological:      General: No focal deficit present. Mental Status: She is oriented to person, place, and time. Cranial Nerves: No cranial nerve deficit. Psychiatric:         Mood and Affect: Mood is anxious. Speech: Speech is rapid and pressured. Cognition and Memory: Cognition normal.             Diagnostic Study Results   Labs  No results found for this or any previous visit (from the past 24 hour(s)). Radiologic Studies  No orders to display     CT Results  (Last 48 hours)    None        CXR Results  (Last 48 hours)    None          Procedures   Procedures    Medical Decision Making     I reviewed the patient's most recent Emergency Dept notes and diagnostic tests  in formulating my MDM on today's visit. Provider Notes (Medical Decision Making):   52-year-old female with eye pain and eye redness, most in the left but also somewhat on the right. On examination she was wearing colored contact lenses. I had to remove the lenses, and under fluorescein exam she has bilateral corneal ulcers, with diffuse corneal haziness and fluorescein uptake by exam.    Her examination is consistent with acute keratitis bilaterally, likely with corneal ulcerations bilaterally. I consulted ophthalmology, spoke with Dr. Lisa Cope at [facility]. He can see the patient in clinic in a few hours.   He advised no antibiotics at this time, he will examine her and prescribe as needed. Ketty English MD  10:03 AM    Consults:    Social History     Tobacco Use    Smoking status: Never Smoker    Smokeless tobacco: Never Used   Substance Use Topics    Alcohol use: Yes    Drug use: No     No data found. Prescriptions from today's ED visit:  Discharge Medication List as of 11/27/2020 11:52 AM           Medications Administered during ED course:  Medications   ibuprofen (MOTRIN) tablet 800 mg (800 mg Oral Given 11/27/20 1013)          Diagnosis and Disposition     Disposition:  Discharged    Clinical Impression:   1. Keratitis, left    2. Keratitis, right    3. Cornea ulcer, right    4. Cornea ulcer, left        Attestation:  I personally performed the services described in this documentation on this date 11/27/2020 for patient Wicho Huerta. Ketty English MD        I was the first provider for this patient on this visit. To the best of my ability I reviewed relevant prior medical records, electrocardiograms, laboratories, and radiologic studies. The patient's presenting problems were discussed, and the patient was in agreement with the care plan formulated and outlined with them. Ketty English MD    Please note that this dictation was completed with Dragon voice recognition software. Quite often unanticipated grammatical, syntax, homophones, and other interpretive errors are inadvertently transcribed by the computer software. Please disregard these errors and excuse any errors that have escaped final proofreading.

## 2020-11-27 NOTE — ED NOTES
Cristian Bolaños reviewed discharge instructions with the patient. The patient verbalized understanding. All questions and concerns were addressed. The patient declined a wheelchair and is discharged ambulatory in the care of family members with instructions and prescriptions in hand. Pt is alert and oriented x 4. Respirations are clear and unlabored.

## 2020-11-30 LAB
BACTERIA SPEC CULT: ABNORMAL
BACTERIA SPEC CULT: ABNORMAL
SERVICE CMNT-IMP: ABNORMAL

## 2020-12-28 LAB
BACTERIA SPEC CULT: NORMAL
SERVICE CMNT-IMP: NORMAL

## 2022-04-19 ENCOUNTER — OFFICE VISIT (OUTPATIENT)
Dept: ORTHOPEDIC SURGERY | Age: 57
End: 2022-04-19
Payer: OTHER MISCELLANEOUS

## 2022-04-19 VITALS — WEIGHT: 170 LBS | HEIGHT: 63 IN | BODY MASS INDEX: 30.12 KG/M2

## 2022-04-19 DIAGNOSIS — M54.50 CHRONIC BILATERAL LOW BACK PAIN, UNSPECIFIED WHETHER SCIATICA PRESENT: Primary | ICD-10-CM

## 2022-04-19 DIAGNOSIS — G89.29 CHRONIC BILATERAL LOW BACK PAIN, UNSPECIFIED WHETHER SCIATICA PRESENT: Primary | ICD-10-CM

## 2022-04-19 PROCEDURE — 99204 OFFICE O/P NEW MOD 45 MIN: CPT | Performed by: ORTHOPAEDIC SURGERY

## 2022-04-19 RX ORDER — TRAMADOL HYDROCHLORIDE 50 MG/1
50 TABLET ORAL
Qty: 40 TABLET | Refills: 0 | Status: SHIPPED | OUTPATIENT
Start: 2022-04-19 | End: 2022-05-19

## 2022-04-19 RX ORDER — DICLOFENAC SODIUM 75 MG/1
TABLET, DELAYED RELEASE ORAL
COMMUNITY
Start: 2022-01-28

## 2022-04-19 RX ORDER — METHOCARBAMOL 750 MG/1
TABLET, FILM COATED ORAL
COMMUNITY
Start: 2022-04-11

## 2022-04-19 RX ORDER — CYCLOBENZAPRINE HCL 10 MG
TABLET ORAL
COMMUNITY
Start: 2022-01-15

## 2022-04-19 NOTE — PROGRESS NOTES
1. Have you been to the ER, urgent care clinic since your last visit? Hospitalized since your last visit? No    2. Have you seen or consulted any other health care providers outside of the [address] [place] [address] since your last visit? Include any pap smears or colon screening.  No    Chief Complaint   Patient presents with    Back Pain     Lumbar MRI @[facility] 1/24/22

## 2022-04-19 NOTE — LETTER
4/19/2022 4:57 PM    Ms. Arpita He  [address]  [place] [address] [facility] [id number]      NOTIFICATION RETURN TO WORK       WORK STATUS AND RESTRICTIONS / WHAT Arpita He CAN DO SAFELY        To Whom It May Concern:    Arpita He  is currently under the care of [facility].     _X__Can Return to Work With the Following Restrictions: [address] only until seen by pain managemnet   _x__ No Lifting Above 15 Pounds   _x__ Limit Pushing / Pulling   _x__ Limit Bending / Twisting   _x__ Must alternate sitting and standing positions                   Sincerely,      Chani Overton MD

## 2022-04-20 NOTE — PATIENT INSTRUCTIONS
Spondylolysis and Spondylolisthesis: Exercises  Introduction  Here are some examples of exercises for you to try. The exercises may be suggested for a condition or for rehabilitation. Start each exercise slowly. Ease off the exercises if you start to have pain. You will be told when to start these exercises and which ones will work best for you. How to do the exercises  Single knee-to-chest    1. Lie on your back with your knees bent and your feet flat on the floor. You can put a small pillow under your head and neck if it is more comfortable. 2. Bring one knee to your chest, keeping the other foot flat on the floor. 3. Keep your lower back pressed to the floor. Hold for 15 to 30 seconds. 4. Relax, and lower the knee to the starting position. 5. Repeat with the other leg. Repeat 2 to 4 times with each leg. 6. To get more stretch, put your other leg flat on the floor while pulling your knee to your chest.  Double knee-to-chest    1. Lie on your back with your knees bent and your feet flat on the floor. You can put a small pillow under your head and neck if it is more comfortable. 2. Bring both knees to your chest.  3. Keep your lower back pressed to the floor. Hold for 15 to 30 seconds. 4. Relax, and lower your knees to the starting position. 5. Repeat 2 to 4 times. Alternate arm and leg (bird dog) exercise    Do this exercise slowly. Try to keep your body straight at all times. 1. Start on the floor, on your hands and knees. 2. Tighten your belly muscles by pulling your belly button in toward your spine. Be sure you continue to breathe normally and do not hold your breath. 3. Raise one arm off the floor, and hold it straight out in front of you. Be careful not to let your shoulder drop down, because that will twist your trunk. 4. Hold for about 6 seconds, then lower your arm and switch to your other arm. 5. Repeat 8 to 12 times on each arm.   6. When you can do this exercise with ease and no pain, repeat steps 1 through 5. But this time do it with one leg raised off the floor, holding your leg straight out behind you. Be careful not to let your hip drop down, because that will twist your trunk. 7. When holding your leg straight out becomes easier, try raising your opposite arm at the same time, and repeat steps 1 through 5. Bridging    1. Lie on your back with both knees bent. Your knees should be bent about 90 degrees. 2. Then push your feet into the floor, squeeze your buttocks, and lift your hips off the floor until your shoulders, hips, and knees are all in a straight line. 3. Hold for about 6 seconds as you continue to breathe normally, and then slowly lower your hips back down to the floor and rest for up to 10 seconds. 4. Repeat 8 to 12 times. Curl-ups    1. Lie on the floor on your back with your knees bent at a 90-degree angle. Your feet should be flat on the floor, about 12 inches from your buttocks. 2. Cross your arms over your chest. If this bothers your neck, try putting your hands behind your neck (not your head), with your elbows spread apart. 3. Slowly tighten your belly muscles and raise your shoulder blades off the floor. 4. Keep your head in line with your body, and do not press your chin to your chest.  5. Hold this position for 1 or 2 seconds, then slowly lower yourself back down to the floor. 6. Repeat 8 to 12 times. Plank    Do this exercise slowly. Try to keep your body straight at all times, and do not let one hip drop lower than the other. 1. Lie on your stomach, resting your upper body on your forearms. 2. Tighten your belly muscles by pulling your belly button in toward your spine. 3. Keeping your knees on the floor, press down with your forearms to lift your upper body off the floor. 4. Hold for about 6 seconds, then lower your body to the floor. Rest for up to 10 seconds. 5. Repeat 8 to 12 times.   6. Over time, work up to holding for 15 to 30 seconds each time.  7. If this exercise is easy to do with your knees on the floor, try doing this exercise with your knees and legs straight, supported by your toes on the floor. Follow-up care is a key part of your treatment and safety. Be sure to make and go to all appointments, and call your doctor if you are having problems. It's also a good idea to know your test results and keep a list of the medicines you take. Where can you learn more? Go to http://www.garcia.com/  Enter M245 in the search box to learn more about \"Spondylolysis and Spondylolisthesis: Exercises. \"  Current as of: July 1, 2021               Content Version: 13.2  © 2006-2022 [employer]. Care instructions adapted under license by [employer] (which disclaims liability or warranty for this information). If you have questions about a medical condition or this instruction, always ask your healthcare professional. [address] any warranty or liability for your use of this information.

## 2022-04-20 NOTE — PROGRESS NOTES
Omkar Villafana (: 1965) is a 55 y.o. female, patient, here for evaluation of the following chief complaint(s):  Back Pain (Lumbar MRI @[facility])       ASSESSMENT/PLAN:    Below is the assessment and plan developed based on review of pertinent history, physical exam, labs, studies, and medications. At this point her pain presentation is greater than would be expected given the injury and the length of time since her injury. Review of her MRI shows mild central disc bulge at L4-5 but no significant thecal compression. Mild degeneration at T11-T12 which is likely degenerative. None of these objective findings correlate with the level of reported discomfort. There is no surgical indication here. I have referred her to pain management for further treatment. I have given her a prescription for tramadol for 1 time only until she can get to pain management. I have modified her work duty temporarily until she can get the pain management. They will then take over her care. We will see her as needed. 1. Chronic bilateral low back pain, unspecified whether sciatica present  -     XR SPINE LUMB MIN 4 V; Future  -     REFERRAL TO PAIN MANAGEMENT; Future  -     traMADoL (ULTRAM) 50 mg tablet; Take 1 Tablet by mouth every six (6) hours as needed for Pain for up to 30 days. Max Daily Amount: 200 mg. Indications: pain, Normal, Disp-40 Tablet, R-0      No follow-ups on file. SUBJECTIVE/OBJECTIVE:  Omkar Villafana (: 1965) is a 55 y.o. female. No flowsheet data found. She comes in today for a injury of the lumbar spine. She states that approximately 5 months ago she fell while working in a [employer] after she tripped on some food. She landed on her right side. She is complaining of severe pain in the tailbone region lower back region upper shoulder region. She denies any radicular symptoms per se. She denies any bowel bladder difficulties.   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 She has an MRI for review. Imaging:    XR Results (most recent):  Results from Appointment encounter on 04/19/22    XR SPINE LUMB MIN 4 V    Narrative  4 views of the lumbar spine were reviewed today. Maintained lumbar lordosis is noted. Mild age-related degenerative changes are noted. No instability with flexion-extension. No fracture lytic lesions are noted. MRI Results (most recent):    I reviewed her most recent MRI which was performed at an outside facility. The MRI shows a central disc bulge at L4-5 with no significant thecal compression. Spondylosis at T11-T12 with no significant compression. No fractures or lytic lesions are noted. No acute injury noted. Allergies   Allergen Reactions    Latex, Natural Rubber Anaphylaxis    Dolobid [Diflunisal] Anaphylaxis    Penicillin G Anaphylaxis       Current Outpatient Medications   Medication Sig    cyclobenzaprine (FLEXERIL) 10 mg tablet     diclofenac EC (VOLTAREN) 75 mg EC tablet     methocarbamoL (ROBAXIN) 750 mg tablet     traMADoL (ULTRAM) 50 mg tablet Take 1 Tablet by mouth every six (6) hours as needed for Pain for up to 30 days. Max Daily Amount: 200 mg. Indications: pain    ibuprofen (MOTRIN) 600 mg tablet Take 1 Tab by mouth every six (6) hours as needed for Pain.  ciprofloxacin HCl (CILOXAN) 0.3 % ophthalmic solution Administer 1 drop to both eyes four (4) times daily. (Patient not taking: Reported on 4/19/2022)    FEXOFENADINE HCL (ALLEGRA PO) Take  by mouth daily. (Patient not taking: Reported on 4/19/2022)    ferrous sulfate (IRON) 325 mg (65 mg elemental iron) tablet Take  by mouth three (3) times daily (with meals). (Patient not taking: Reported on 4/19/2022)     No current facility-administered medications for this visit.        Past Medical History:   Diagnosis Date    Elevated blood pressure     Heart murmur         Past Surgical History: Procedure Laterality Date    HX  SECTION      HX HEENT      sinus surgery    HX ORTHOPAEDIC      left ring finger trigger release    HX WISDOM TEETH EXTRACTION      UT BLOOD TRANSFUSION SERVICE             Family History   Problem Relation Age of Onset    Hypertension Mother     Stroke Mother     Cancer Maternal Grandmother         Breast Cancer    Diabetes Maternal Grandmother     Diabetes Paternal Grandmother         Social History     Tobacco Use    Smoking status: Never Smoker    Smokeless tobacco: Never Used   Substance Use Topics    Alcohol use: Yes    Drug use: No        Review of Systems       Vitals:  Ht 5' 3\" (1.6 m)   Wt 170 lb (77.1 kg)   BMI 30.11 kg/m²    Body mass index is 30.11 kg/m². Ortho Exam       Alert and [place]  x 3    Normal gait and station; normal posture    No assistive devices today. Cervical spine:  Examination of the cervical spine demonstrates no tenderness on palpation, no pain, no swelling or edema, with normal lumbar range of motion. Thoracic spine: Examination of the thoracic spine demonstrates no tenderness on palpation, no pain, no swelling or edema. Normal sensation and range of motion. Lumbar spine:     Examination of the lumbar spine demonstrates on inspection: No abnormal cutaneous markings. No previous surgical incisions. No pelvic obliquity. .    On palpation: Tenderness on palpation throughout the lower lumbar spine region generally. Significant pain response with gentle palpation. Range of motion: Limited range of motion testing due to significant subjective guarding by the patient.     Motor examination:  Right iliopsoas 5/5,  left iliopsoas 5/5, right quad 5/5, left quad 5/5, right anterior tibialis 5/5, left anterior tibialis 5/5, right EHL 5/5, left EHL 5/5, right gastrocnemius 5/5 , left gastrocnemius 5/5    Sensory examination: Reveals no deficit    Reflexes: Left knee 2/2, right knee 2/2, right ankle 2/2, left ankle 2/2    Functional testing: Straight leg test negative bilateral; bowstring sign on the positive bilaterally    Babinsksi and Clonus negative bilaterally. An electronic signature was used to authenticate this note.   -- Latoya Fonseca MD

## 2022-06-15 ENCOUNTER — HOSPITAL ENCOUNTER (EMERGENCY)
Age: 57
Discharge: HOME | End: 2022-06-15
Attending: EMERGENCY MEDICINE | Admitting: EMERGENCY MEDICINE
Payer: COMMERCIAL

## 2022-06-15 VITALS
DIASTOLIC BLOOD PRESSURE: 98 MMHG | HEART RATE: 89 BPM | TEMPERATURE: 97.8 F | RESPIRATION RATE: 16 BRPM | OXYGEN SATURATION: 100 % | SYSTOLIC BLOOD PRESSURE: 148 MMHG

## 2022-06-15 DIAGNOSIS — M62.838 MUSCLE SPASM: ICD-10-CM

## 2022-06-15 DIAGNOSIS — M54.50 CHRONIC BILATERAL LOW BACK PAIN WITHOUT SCIATICA: Primary | ICD-10-CM

## 2022-06-15 DIAGNOSIS — G89.29 CHRONIC BILATERAL LOW BACK PAIN WITHOUT SCIATICA: Primary | ICD-10-CM

## 2022-06-15 PROCEDURE — 96372 THER/PROPH/DIAG INJ SC/IM: CPT

## 2022-06-15 PROCEDURE — 74011000250 HC RX REV CODE- 250: Performed by: EMERGENCY MEDICINE

## 2022-06-15 PROCEDURE — 74011250636 HC RX REV CODE- 250/636: Performed by: EMERGENCY MEDICINE

## 2022-06-15 PROCEDURE — 74011250637 HC RX REV CODE- 250/637: Performed by: EMERGENCY MEDICINE

## 2022-06-15 PROCEDURE — 99284 EMERGENCY DEPT VISIT MOD MDM: CPT

## 2022-06-15 RX ORDER — LIDOCAINE 4 G/100G
1 PATCH TOPICAL EVERY 24 HOURS
Status: DISCONTINUED | OUTPATIENT
Start: 2022-06-15 | End: 2022-06-15 | Stop reason: HOSPADM

## 2022-06-15 RX ORDER — LIDOCAINE 50 MG/G
PATCH TOPICAL
Qty: 5 EACH | Refills: 0 | Status: SHIPPED | OUTPATIENT
Start: 2022-06-15

## 2022-06-15 RX ORDER — KETOROLAC TROMETHAMINE 30 MG/ML
30 INJECTION, SOLUTION INTRAMUSCULAR; INTRAVENOUS
Status: COMPLETED | OUTPATIENT
Start: 2022-06-15 | End: 2022-06-15

## 2022-06-15 RX ORDER — DIAZEPAM 5 MG/1
5 TABLET ORAL
Status: COMPLETED | OUTPATIENT
Start: 2022-06-15 | End: 2022-06-15

## 2022-06-15 RX ORDER — DIAZEPAM 5 MG/1
5 TABLET ORAL
Qty: 12 TABLET | Refills: 0 | Status: SHIPPED | OUTPATIENT
Start: 2022-06-15

## 2022-06-15 RX ORDER — METHYLPREDNISOLONE 4 MG/1
TABLET ORAL
Qty: 1 DOSE PACK | Refills: 0 | Status: SHIPPED | OUTPATIENT
Start: 2022-06-15

## 2022-06-15 RX ADMIN — DIAZEPAM 5 MG: 5 TABLET ORAL at 06:44

## 2022-06-15 RX ADMIN — KETOROLAC TROMETHAMINE 30 MG: 30 INJECTION, SOLUTION INTRAMUSCULAR; INTRAVENOUS at 06:44

## 2022-06-15 NOTE — DISCHARGE INSTRUCTIONS
Please use the muscle relaxer along steroids for your back pain. See Dr Dawson Factor next week for symptom recheck. Thank you.

## 2022-06-15 NOTE — ED NOTES
MD reviewed discharge instructions and options with patient and patient verbalized understanding. RN reviewed discharge instructions using teachback method. Pt ambulated to exit without difficulty and in no signs of acute distress, and she  will drive home. No complaints or needs expressed at this time. Patient was counseled on medications prescribed at discharge. VSS, verbalized relief from most intense pain. Patient to call Dr. Kenrick Sullivan in the morning for appointment.

## 2022-06-15 NOTE — ED TRIAGE NOTES
Pt reports she hurt her back in november and has had intermittent back pain ever since.  Pt reports she is currently waiting to be seen by a specialist.

## 2022-06-16 RX ORDER — LIDOCAINE HYDROCHLORIDE 20 MG/ML
JELLY TOPICAL
Status: DISPENSED | OUTPATIENT
Start: 2022-06-16 | End: 2022-06-16

## 2022-06-16 NOTE — ED PROVIDER NOTES
Is a 69-year-old female with history of chronic back pain, hypertension, heart murmur who presents with back pain. Patient reports that she is waiting to get in with pain management after being referred by Dr. Milena Peacock but it is taking a long time since it has to go through [email or web address]. Notes that pain started last fall after a slip and fall at work. Notes that she is still working daily, and pain is worse with movement. Denies any lower extremity weakness, numbness. No saddle anesthesia. No urinary or bowel incontinence. Says she uses NSAIDs at home for pain with minimal relief. 1           Past Medical History:   Diagnosis Date    Elevated blood pressure     Heart murmur        Past Surgical History:   Procedure Laterality Date    HX  SECTION      HX HEENT      sinus surgery    HX ORTHOPAEDIC      left ring finger trigger release    HX WISDOM TEETH EXTRACTION      [place] BLOOD TRANSFUSION SERVICE               Family History:   Problem Relation Age of Onset    Hypertension Mother     Stroke Mother     Cancer Maternal Grandmother         Breast Cancer    Diabetes Maternal Grandmother     Diabetes Paternal Grandmother        Social History     Socioeconomic History    Marital status: SINGLE     Spouse name: Not on file    Number of children: Not on file    Years of education: Not on file    Highest education level: Not on file   Occupational History    Not on file   Tobacco Use    Smoking status: Never Smoker    Smokeless tobacco: Never Used   Substance and Sexual Activity    Alcohol use:  Yes    Drug use: No    Sexual activity: Yes     Partners: Male     Birth control/protection: Condom   Other Topics Concern    Not on file   Social History Narrative    Not on file     Social Determinants of Health     Financial Resource Strain:     Difficulty of Paying Living Expenses: Not on file   Food Insecurity:     Worried About Running Out of Food in the Last Year: Not on file    Swathi of Food in the Last Year: Not on file   Transportation Needs:     Lack of Transportation (Medical): Not on file    Lack of Transportation (Non-Medical): Not on file   Physical Activity:     Days of Exercise per Week: Not on file    Minutes of Exercise per Session: Not on file   Stress:     Feeling of Stress : Not on file   Social Connections:     Frequency of Communication with Friends and Family: Not on file    Frequency of Social Gatherings with Friends and Family: Not on file    Attends Evangelical Services: Not on file    Active Member of [address] [place] [address] [employer] or Organizations: Not on file    Attends Club or Organization Meetings: Not on file    Marital Status: Not on file   Intimate Partner Violence:     Fear of Current or Ex-Partner: Not on file    Emotionally Abused: Not on file    Physically Abused: Not on file    Sexually Abused: Not on file   Housing Stability:     Unable to Pay for Housing in the Last Year: Not on file    Number of [place] in the Last Year: Not on file    Unstable Housing in the Last Year: Not on file         ALLERGIES: Latex, natural rubber; Dolobid [diflunisal]; and Penicillin g    Review of Systems   Constitutional: Negative for chills and fever. HENT: Negative for drooling and nosebleeds. Eyes: Negative for pain and itching. Respiratory: Negative for choking and stridor. Cardiovascular: Negative for leg swelling. Gastrointestinal: Negative for abdominal distention and rectal pain. Endocrine: Negative for heat intolerance and polyphagia. Genitourinary: Negative for enuresis and genital sores. Musculoskeletal: Positive for back pain. Negative for arthralgias and joint swelling. Skin: Negative for color change. Allergic/Immunologic: Negative for immunocompromised state. Neurological: Negative for tremors and speech difficulty. Hematological: Negative for adenopathy. Psychiatric/Behavioral: Negative for dysphoric mood and sleep disturbance.        Vitals: 06/15/22 0559   BP: (!) 148/98   Pulse: 89   Resp: 16   Temp: 97.8 °F (36.6 °C)   SpO2: 100%            Physical Exam  Vitals and nursing note reviewed. Constitutional:       General: She is not in acute distress. Appearance: She is well-developed. She is not ill-appearing, toxic-appearing or diaphoretic. HENT:      Head: Normocephalic and atraumatic. Nose: Nose normal.   Eyes:      Conjunctiva/sclera: Conjunctivae normal.   Cardiovascular:      Rate and Rhythm: Regular rhythm. Heart sounds: Normal heart sounds. Pulmonary:      Effort: Pulmonary effort is normal. No respiratory distress. Abdominal:      General: There is no distension. Palpations: Abdomen is soft. Comments: Back: no vertebral TTP. Diffuse bl lumbar back pain. Musculoskeletal:         General: No deformity. Normal range of motion. Cervical back: Normal range of motion and neck supple. Skin:     General: Skin is warm and dry. Neurological:      Mental Status: She is alert. Coordination: Coordination normal.   Psychiatric:         Behavior: Behavior normal.          MDM  Number of Diagnoses or Management Options  Chronic bilateral low back pain without sciatica  Muscle spasm  Diagnosis management comments: No acute exacerbation of chronic back pain today. Patient has MRI results that show degenerative disc disease. Awaiting pain management appointment. Will discharge with meds to bridge to appointment. Procedures    Patient's results have been reviewed with them. Patient and/or family have verbally conveyed their understanding and agreement of the patient's signs, symptoms, diagnosis, treatment and prognosis and additionally agree to follow up as recommended or return to the Emergency Room should their condition change prior to follow-up.   Discharge instructions have also been provided to the patient with some educational information regarding their diagnosis as well a list of reasons why they would want to return to the ER prior to their follow-up appointment should their condition change.

## 2022-06-20 ENCOUNTER — TELEPHONE (OUTPATIENT)
Dept: ORTHOPEDIC SURGERY | Age: 57
End: 2022-06-20

## 2022-06-20 NOTE — TELEPHONE ENCOUNTER
Onesimo Jimenez  Calls to report that she continues to have pain, she has not been seen by Pain Management due to her WC not approving the referral. She went the ER to help manage her pain and was given Medrol, Valium and Lidoderm patches. She calls to day for refills of these medications. Based on the last office note the request was denied. She was advised if her WC will not approve her meds she may go to her PCP or back to the ER.      Have a wonderful day,     Dawn Jorge RN, BSN  Registered Nurse to [address] [place] [address] [address] [place] [address]    Office: [phone]  Fax: [phone]

## 2022-10-10 DIAGNOSIS — G89.29 CHRONIC BILATERAL LOW BACK PAIN, UNSPECIFIED WHETHER SCIATICA PRESENT: ICD-10-CM

## 2022-10-10 DIAGNOSIS — M54.50 CHRONIC BILATERAL LOW BACK PAIN, UNSPECIFIED WHETHER SCIATICA PRESENT: ICD-10-CM

## 2024-11-19 ENCOUNTER — OFFICE VISIT (OUTPATIENT)
Age: 59
End: 2024-11-19
Payer: COMMERCIAL

## 2024-11-19 VITALS
HEIGHT: 63 IN | TEMPERATURE: 97.9 F | DIASTOLIC BLOOD PRESSURE: 81 MMHG | RESPIRATION RATE: 17 BRPM | BODY MASS INDEX: 33.98 KG/M2 | WEIGHT: 191.8 LBS | HEART RATE: 95 BPM | OXYGEN SATURATION: 98 % | SYSTOLIC BLOOD PRESSURE: 126 MMHG

## 2024-11-19 DIAGNOSIS — I10 PRIMARY HYPERTENSION: ICD-10-CM

## 2024-11-19 DIAGNOSIS — Z00.00 ANNUAL PHYSICAL EXAM: ICD-10-CM

## 2024-11-19 DIAGNOSIS — R53.82 CHRONIC FATIGUE: ICD-10-CM

## 2024-11-19 DIAGNOSIS — Z13.1 DIABETES MELLITUS SCREENING: ICD-10-CM

## 2024-11-19 DIAGNOSIS — E78.2 MIXED HYPERLIPIDEMIA: ICD-10-CM

## 2024-11-19 DIAGNOSIS — D50.9 IRON DEFICIENCY ANEMIA, UNSPECIFIED IRON DEFICIENCY ANEMIA TYPE: ICD-10-CM

## 2024-11-19 DIAGNOSIS — Z00.00 ANNUAL PHYSICAL EXAM: Primary | ICD-10-CM

## 2024-11-19 DIAGNOSIS — Z12.31 ENCOUNTER FOR SCREENING MAMMOGRAM FOR MALIGNANT NEOPLASM OF BREAST: ICD-10-CM

## 2024-11-19 DIAGNOSIS — Z12.11 COLON CANCER SCREENING: ICD-10-CM

## 2024-11-19 LAB
ALBUMIN SERPL-MCNC: 3.7 G/DL (ref 3.5–5)
ALBUMIN/GLOB SERPL: 1.1 (ref 1.1–2.2)
ALP SERPL-CCNC: 103 U/L (ref 45–117)
ALT SERPL-CCNC: 17 U/L (ref 12–78)
ANION GAP SERPL CALC-SCNC: 4 MMOL/L (ref 2–12)
AST SERPL-CCNC: 20 U/L (ref 15–37)
BASOPHILS # BLD: 0.1 K/UL (ref 0–0.1)
BASOPHILS NFR BLD: 2 % (ref 0–1)
BILIRUB SERPL-MCNC: 0.3 MG/DL (ref 0.2–1)
BUN SERPL-MCNC: 19 MG/DL (ref 6–20)
BUN/CREAT SERPL: 23 (ref 12–20)
CALCIUM SERPL-MCNC: 9.8 MG/DL (ref 8.5–10.1)
CHLORIDE SERPL-SCNC: 111 MMOL/L (ref 97–108)
CHOLEST SERPL-MCNC: 250 MG/DL
CO2 SERPL-SCNC: 29 MMOL/L (ref 21–32)
CREAT SERPL-MCNC: 0.82 MG/DL (ref 0.55–1.02)
DIFFERENTIAL METHOD BLD: ABNORMAL
EOSINOPHIL # BLD: 0.3 K/UL (ref 0–0.4)
EOSINOPHIL NFR BLD: 7 % (ref 0–7)
ERYTHROCYTE [DISTWIDTH] IN BLOOD BY AUTOMATED COUNT: 13.9 % (ref 11.5–14.5)
EST. AVERAGE GLUCOSE BLD GHB EST-MCNC: 126 MG/DL
GLOBULIN SER CALC-MCNC: 3.3 G/DL (ref 2–4)
GLUCOSE SERPL-MCNC: 81 MG/DL (ref 65–100)
HBA1C MFR BLD: 6 % (ref 4–5.6)
HCT VFR BLD AUTO: 38.9 % (ref 35–47)
HDLC SERPL-MCNC: 103 MG/DL
HDLC SERPL: 2.4 (ref 0–5)
HGB BLD-MCNC: 12.8 G/DL (ref 11.5–16)
IMM GRANULOCYTES # BLD AUTO: 0 K/UL (ref 0–0.04)
IMM GRANULOCYTES NFR BLD AUTO: 0 % (ref 0–0.5)
LDLC SERPL CALC-MCNC: 129 MG/DL (ref 0–100)
LYMPHOCYTES # BLD: 1.9 K/UL (ref 0.8–3.5)
LYMPHOCYTES NFR BLD: 44 % (ref 12–49)
MCH RBC QN AUTO: 29.7 PG (ref 26–34)
MCHC RBC AUTO-ENTMCNC: 32.9 G/DL (ref 30–36.5)
MCV RBC AUTO: 90.3 FL (ref 80–99)
MONOCYTES # BLD: 0.5 K/UL (ref 0–1)
MONOCYTES NFR BLD: 12 % (ref 5–13)
NEUTS SEG # BLD: 1.5 K/UL (ref 1.8–8)
NEUTS SEG NFR BLD: 35 % (ref 32–75)
NRBC # BLD: 0 K/UL (ref 0–0.01)
NRBC BLD-RTO: 0 PER 100 WBC
PLATELET # BLD AUTO: 259 K/UL (ref 150–400)
PMV BLD AUTO: 11.7 FL (ref 8.9–12.9)
POTASSIUM SERPL-SCNC: 4.3 MMOL/L (ref 3.5–5.1)
PROT SERPL-MCNC: 7 G/DL (ref 6.4–8.2)
RBC # BLD AUTO: 4.31 M/UL (ref 3.8–5.2)
SODIUM SERPL-SCNC: 144 MMOL/L (ref 136–145)
TRIGL SERPL-MCNC: 90 MG/DL
TSH SERPL DL<=0.05 MIU/L-ACNC: 1.63 UIU/ML (ref 0.36–3.74)
VLDLC SERPL CALC-MCNC: 18 MG/DL
WBC # BLD AUTO: 4.3 K/UL (ref 3.6–11)

## 2024-11-19 PROCEDURE — 3079F DIAST BP 80-89 MM HG: CPT | Performed by: STUDENT IN AN ORGANIZED HEALTH CARE EDUCATION/TRAINING PROGRAM

## 2024-11-19 PROCEDURE — 99386 PREV VISIT NEW AGE 40-64: CPT | Performed by: STUDENT IN AN ORGANIZED HEALTH CARE EDUCATION/TRAINING PROGRAM

## 2024-11-19 PROCEDURE — 3074F SYST BP LT 130 MM HG: CPT | Performed by: STUDENT IN AN ORGANIZED HEALTH CARE EDUCATION/TRAINING PROGRAM

## 2024-11-19 RX ORDER — LOSARTAN POTASSIUM 50 MG/1
TABLET ORAL
Qty: 90 TABLET | Refills: 1 | Status: SHIPPED | OUTPATIENT
Start: 2024-11-19 | End: 2024-11-20 | Stop reason: SDUPTHER

## 2024-11-19 RX ORDER — LOSARTAN POTASSIUM 50 MG/1
TABLET ORAL
Qty: 90 TABLET | Refills: 1 | Status: SHIPPED | OUTPATIENT
Start: 2024-11-19 | End: 2024-11-19 | Stop reason: SDUPTHER

## 2024-11-19 SDOH — ECONOMIC STABILITY: FOOD INSECURITY: WITHIN THE PAST 12 MONTHS, THE FOOD YOU BOUGHT JUST DIDN'T LAST AND YOU DIDN'T HAVE MONEY TO GET MORE.: NEVER TRUE

## 2024-11-19 SDOH — ECONOMIC STABILITY: FOOD INSECURITY: WITHIN THE PAST 12 MONTHS, YOU WORRIED THAT YOUR FOOD WOULD RUN OUT BEFORE YOU GOT MONEY TO BUY MORE.: NEVER TRUE

## 2024-11-19 SDOH — ECONOMIC STABILITY: INCOME INSECURITY: HOW HARD IS IT FOR YOU TO PAY FOR THE VERY BASICS LIKE FOOD, HOUSING, MEDICAL CARE, AND HEATING?: NOT HARD AT ALL

## 2024-11-19 ASSESSMENT — PATIENT HEALTH QUESTIONNAIRE - PHQ9
SUM OF ALL RESPONSES TO PHQ QUESTIONS 1-9: 0
SUM OF ALL RESPONSES TO PHQ QUESTIONS 1-9: 0
SUM OF ALL RESPONSES TO PHQ9 QUESTIONS 1 & 2: 0
SUM OF ALL RESPONSES TO PHQ QUESTIONS 1-9: 0
1. LITTLE INTEREST OR PLEASURE IN DOING THINGS: NOT AT ALL
2. FEELING DOWN, DEPRESSED OR HOPELESS: NOT AT ALL
SUM OF ALL RESPONSES TO PHQ QUESTIONS 1-9: 0

## 2024-11-19 NOTE — ASSESSMENT & PLAN NOTE
I have discussed with pt the following topics:   - Discussed with patient the cancer risk factors and recommendations  - Reviewed diet, exercise and weight control  -Immunizations appropriate for age were discussed with patient and updated   - Recommended sodium restriction  - Reviewed medications and side effects in detail

## 2024-11-19 NOTE — PROGRESS NOTES
Previous PCP: No Previous PCP    Chief Complaint   Patient presents with    New Patient    Establish Care     \"Have you been to the ER, urgent care clinic since your last visit?  Hospitalized since your last visit?\"      [facility] 2024 Injection    [employer] [facility] 2024 For Elevated BP    “Have you seen or consulted any other health care providers outside of [facility] since your last visit?”      Yes  Work's Man Comp.  2024  [facility]  Eleuterio Holbrook MD   Peroneal tendon tear, right/ Acute right ankle pain     “Have you had a colorectal cancer screening such as a colonoscopy/FIT/Cologuard?    NO     Have you had a mammogram?”   NO       “Have you had a pap smear?”    NO      Vitals:    24 1034   BP: 126/81   Pulse: 95   Resp: 17   Temp: 97.9 °F (36.6 °C)   TempSrc: Temporal   SpO2: 98%   Weight: 87 kg (191 lb 12.8 oz)   Height: 1.6 m (5' 3\")      Health Maintenance Due   Topic Date Due    HIV screen  Never done    Hepatitis C screen  Never done    Hepatitis B vaccine (1 of 3 - 19+ 3-dose series) Never done    Cervical cancer screen  Never done    Diabetes screen  Never done    Lipids  Never done    Breast cancer screen  Never done    Colorectal Cancer Screen  Never done    Shingles vaccine (1 of 2) Never done    DTaP/Tdap/Td vaccine (2 - Td or Tdap) 2023    COVID-19 Vaccine (2023- season) Never done      The patient, Leni NASSAR Gracie, identity was verified by name and , pharmacy verified  Labs:Yes  Fasting:Yes [facility] ( Would Like Labs Drawn)

## 2024-11-19 NOTE — ASSESSMENT & PLAN NOTE
Currently at goal although patient states that this is not reflective  of her home BP which is higher. Will give BP to start daily should home BP >135/85 mmHg

## 2024-11-19 NOTE — PROGRESS NOTES
[facility]  [address] [place] [address] [phone]    C/C: Acute/chronic medical problems    Subjective    Leni Bowman is a 59 y.o.  Black /   female who presents to clinic today for evaluation of the issues listed below. Pt is new to the practice.    Previous pcp: None      Subjective;    Patient presenting for initial office visit with me today.    Significant PMHx include: Ventral Hernia s/p repair (in [place]), Ankle pain s/p right foot surgery and back pain. Here to establish care and follow up on acute/chronic conditions;    HTN:  States that home BP and also at the ortho doctor office have been elevated.  States that she went to [employer] (where she works) and it was >170/90 mmHg.    Anemia:  States that she is severely anemic such that she needed blood transfusions.  Use to take Iron pills.    Ankle and back pain:  On gabapentin, Celebrex and patches.    Pt denies any  fever, chill, chest pain, SOB, abdominal pain, n/v/d, HA or dizziness.     Other Health Habits and social history:  Smoking history: No  Alcohol history: socially  Occupation: [employer] in the [employer].  Lives with  (82 yo ).  Adult son and daughter in their 30s.    Other Specialists/providers:  [facility] (worksman comp)  [facility]th[facility] - [facility]     Allergies- reviewed:   Allergies   Allergen Reactions    Latex Anaphylaxis    Diflunisal Anaphylaxis    Penicillin G Anaphylaxis    Prednisone Rash       Past Medical History- reviewed:  Past Medical History:   Diagnosis Date    Elevated blood pressure     Heart murmur        Family History - reviewed:  Family History   Problem Relation Age of Onset    Diabetes Maternal Grandmother     Stroke Mother     Cancer Maternal Grandmother         Breast Cancer    Diabetes Paternal Grandmother     Hypertension Mother        Social History - reviewed:  Social History     Socioeconomic History    Marital status:

## 2024-11-20 ENCOUNTER — TELEPHONE (OUTPATIENT)
Age: 59
End: 2024-11-20

## 2024-11-20 DIAGNOSIS — I10 PRIMARY HYPERTENSION: ICD-10-CM

## 2024-11-20 RX ORDER — LOSARTAN POTASSIUM 50 MG/1
TABLET ORAL
Qty: 90 TABLET | Refills: 1 | Status: SHIPPED | OUTPATIENT
Start: 2024-11-20

## 2024-11-20 NOTE — TELEPHONE ENCOUNTER
Unable to verify if interface went down, medication not confirmed by pharmacy as received. Will upload and re-send to Dr. Bunn to resend.

## 2024-11-20 NOTE — TELEPHONE ENCOUNTER
Pt said that [id number] - [facility] [address] - P [phone] - F [phone] [[id number]]   never received her losartan (COZAAR) 50 MG tablet. She can be reached at [phone] if needed.

## 2024-11-20 NOTE — TELEPHONE ENCOUNTER
Patient stated that she had spoken with a friend who is a RN who suggested she be checked for Lupus. She notices marks on her cheeks, feeling tired and body aches. Scheduled for 12/4/2024 @ 1220 pm to address issues.

## 2024-11-20 NOTE — TELEPHONE ENCOUNTER
Identified patient 2 identifiers verified.  Patient wants a call back at [phone] to discuss  being tested for Lupus.

## 2024-12-04 ENCOUNTER — OFFICE VISIT (OUTPATIENT)
Age: 59
End: 2024-12-04
Payer: COMMERCIAL

## 2024-12-04 VITALS
WEIGHT: 198.41 LBS | TEMPERATURE: 97.9 F | RESPIRATION RATE: 17 BRPM | OXYGEN SATURATION: 99 % | BODY MASS INDEX: 35.16 KG/M2 | HEIGHT: 63 IN | DIASTOLIC BLOOD PRESSURE: 69 MMHG | SYSTOLIC BLOOD PRESSURE: 116 MMHG | HEART RATE: 98 BPM

## 2024-12-04 DIAGNOSIS — F32.A MILD DEPRESSION: ICD-10-CM

## 2024-12-04 DIAGNOSIS — G89.29 CHRONIC BILATERAL LOW BACK PAIN, UNSPECIFIED WHETHER SCIATICA PRESENT: ICD-10-CM

## 2024-12-04 DIAGNOSIS — M54.50 CHRONIC BILATERAL LOW BACK PAIN, UNSPECIFIED WHETHER SCIATICA PRESENT: ICD-10-CM

## 2024-12-04 DIAGNOSIS — I10 PRIMARY HYPERTENSION: Primary | ICD-10-CM

## 2024-12-04 PROCEDURE — 99214 OFFICE O/P EST MOD 30 MIN: CPT | Performed by: STUDENT IN AN ORGANIZED HEALTH CARE EDUCATION/TRAINING PROGRAM

## 2024-12-04 PROCEDURE — 3074F SYST BP LT 130 MM HG: CPT | Performed by: STUDENT IN AN ORGANIZED HEALTH CARE EDUCATION/TRAINING PROGRAM

## 2024-12-04 PROCEDURE — 3078F DIAST BP <80 MM HG: CPT | Performed by: STUDENT IN AN ORGANIZED HEALTH CARE EDUCATION/TRAINING PROGRAM

## 2024-12-04 RX ORDER — GABAPENTIN 300 MG/1
300 CAPSULE ORAL 3 TIMES DAILY
COMMUNITY

## 2024-12-04 RX ORDER — LOSARTAN POTASSIUM 100 MG/1
100 TABLET ORAL DAILY
Qty: 90 TABLET | Refills: 1 | Status: SHIPPED | OUTPATIENT
Start: 2024-12-04

## 2024-12-04 RX ORDER — DULOXETIN HYDROCHLORIDE 30 MG/1
CAPSULE, DELAYED RELEASE ORAL
Qty: 90 CAPSULE | Refills: 1 | Status: SHIPPED | OUTPATIENT
Start: 2024-12-04

## 2024-12-04 NOTE — PROGRESS NOTES
[facility]  [address] [place] [address] [phone]    C/C: Acute/chronic medical problems    Subjective  Leni Bowman is a 59 y.o. Black /  female , established patient, here for evaluation of the concern(s) below;    Significant PMHx include: Ventral Hernia s/p repair (in [place]), Ankle pain s/p right foot surgery and back pain. Here to establish care and follow up on acute/chronic conditions;    HTN:  On Losartan 50mg, but pt has been taking 2 tabs as home BP was still above target.  Tolerates medication well.    Depression:  Pt states that her constant back pain have made her less active and she sometimes feels down.  States that she needs something to help with the mood.  Denies any SI/HI.    Other chronic medical problems;    Chronic back pain:  On gabapentin, Celebrex and patches.  Has had several nerve blocks and also been through PT.  This is ongoing for about 3 years.  Follows with back specialist.    Anemia:  States that she is severely anemic such that she needed blood transfusions.  Use to take Iron pills.    Pt denies any  fever, chill, chest pain, SOB, abdominal pain, n/v/d, HA or dizziness.     Other Health Habits and social history:  Smoking history: No  Alcohol history: socially  Occupation: [employer] in the [employer].  Lives with  (80 yo ).  Adult son and daughter in their 30s.    Other Specialists/providers:  Ortho - Spine (worksman comp)  Ortho - Ankle        Allergies - reviewed:   Allergies   Allergen Reactions    Latex Anaphylaxis    Diflunisal Anaphylaxis    Penicillin G Anaphylaxis    Prednisone Rash       Past Medical History - reviewed:  Past Medical History:   Diagnosis Date    Elevated blood pressure     Heart murmur        Depression screening:  No data recorded    Review of systems:   A comprehensive review of systems was negative except for that written in the History of Present Illness.

## 2024-12-04 NOTE — ASSESSMENT & PLAN NOTE
Patient arrived ambulatory to [facility] for CBC/COD lab draw.  Labs drawn from left a/c, gauze/coban placed.  Confirmed appointment for Saturday and she ambulated out of clinic in no apparent distress.    Hgb 6.8.  BB communication sent for 2 units PRBC for appointment on Saturday.  Spoke with Stephanie in blood back.   Will start on cymbalta.  No SI/HI

## 2024-12-04 NOTE — PROGRESS NOTES
Chief Complaint   Patient presents with    Fatigue     For Years    Generalized Body Aches    Discuss Medications     losartan (COZAAR) 50 MG table        Patient stated that she had spoken with a friend who is a RN who suggested she be checked for Lupus. She notices marks on her cheeks, feeling tired and body aches.     Has chronic back pain due to a fall at [employer] 3-4 years ago.    Patient has been taking losartan (COZAAR) 50 MG tablet twice a day instead of once ad ay because she did not see a difference in her BP until she started taking 2 a day.    Brought in BP log for review with PCP.       \"Have you been to the ER, urgent care clinic since your last visit?  Hospitalized since your last visit?\"    NO    “Have you seen or consulted any other health care providers outside of [facility] since your last visit?”    NO    Vitals:    24 1214   BP: 116/69   Pulse: 98   Resp: 17   Temp: 97.9 °F (36.6 °C)   TempSrc: Temporal   SpO2: 99%   Weight: 90 kg (198 lb 6.6 oz)   Height: 1.6 m (5' 3\")      Health Maintenance Due   Topic Date Due    HIV screen  Never done    Hepatitis C screen  Never done    Hepatitis B vaccine (1 of 3 - 19+ 3-dose series) Never done    Cervical cancer screen  Never done    Breast cancer screen  Never done    Colorectal Cancer Screen  Never done    Shingles vaccine (1 of 2) Never done    DTaP/Tdap/Td vaccine (2 - Td or Tdap) 2023    COVID-19 Vaccine (2023- season) Never done      The patient, Leni Bowman, identity was verified by name and , pharmacy verified  Labs:Yes  Fasting:Yes

## 2024-12-19 PROBLEM — Z00.00 ANNUAL PHYSICAL EXAM: Status: RESOLVED | Noted: 2024-11-19 | Resolved: 2024-12-19

## 2025-03-13 DIAGNOSIS — M54.50 CHRONIC BILATERAL LOW BACK PAIN, UNSPECIFIED WHETHER SCIATICA PRESENT: Primary | ICD-10-CM

## 2025-03-13 DIAGNOSIS — G89.29 CHRONIC BILATERAL LOW BACK PAIN, UNSPECIFIED WHETHER SCIATICA PRESENT: Primary | ICD-10-CM

## 2025-03-13 NOTE — TELEPHONE ENCOUNTER
Patient would like a call back to discuss getting refill on Gabapentin. Patient was told by her Workman's Comp provider that she had to get the prescriptions prescribed by PCP. Patient was injured at work and on workman's comp and  needs  to be seen by PCP.  Patient can be reached at [phone].

## 2025-03-13 NOTE — TELEPHONE ENCOUNTER
Printed and gave message to PSR Nalini to explain to patient regarding [facility] protocol on workman's comp practice involvement Will put in for refill for on gabapentin (NEURONTIN) 300 MG capsule.    Last Seen: 12/4/2024    Next Visit: 4/15/2025   Pupils equal, round and reactive to light, Extra-ocular movement intact, eyes are clear b/l

## 2025-03-14 RX ORDER — GABAPENTIN 300 MG/1
300 CAPSULE ORAL 3 TIMES DAILY
Qty: 90 CAPSULE | Refills: 0 | Status: SHIPPED | OUTPATIENT
Start: 2025-03-14 | End: 2025-04-13

## 2025-04-29 DIAGNOSIS — M54.50 CHRONIC BILATERAL LOW BACK PAIN, UNSPECIFIED WHETHER SCIATICA PRESENT: ICD-10-CM

## 2025-04-29 DIAGNOSIS — F32.A MILD DEPRESSION: ICD-10-CM

## 2025-04-29 DIAGNOSIS — G89.29 CHRONIC BILATERAL LOW BACK PAIN, UNSPECIFIED WHETHER SCIATICA PRESENT: ICD-10-CM

## 2025-04-29 RX ORDER — DULOXETIN HYDROCHLORIDE 30 MG/1
CAPSULE, DELAYED RELEASE ORAL
Qty: 90 CAPSULE | Refills: 1 | Status: SHIPPED | OUTPATIENT
Start: 2025-04-29

## 2025-04-29 NOTE — TELEPHONE ENCOUNTER
Last appointment: 12/4/24  Next appointment: 7/14/25  Previous refill encounter(s): 12/4/24 #90 with 1 refill    Requested Prescriptions     Pending Prescriptions Disp Refills    DULoxetine (CYMBALTA) 30 MG extended release capsule [Pharmacy Med Name: DULoxetine HCl 30 MG Oral Capsule Delayed Release Particles] 90 capsule 1     Sig: TAKE 1 CAPSULE BY MOUTH DAILY  INCREASE TO 2 CAPSULES BY MOUTH  DAILY IF TOLERATED AFTER 2 WEEKS         For Pharmacy Admin Tracking Only    Program: Medication Refill  CPA in place:    Recommendation Provided To:   Intervention Detail: New Rx: 1, reason: Patient Preference  Intervention Accepted By:   Gap Closed?:    Time Spent (min): 5

## 2025-05-19 ENCOUNTER — TELEPHONE (OUTPATIENT)
Age: 60
End: 2025-05-19

## 2025-05-19 NOTE — TELEPHONE ENCOUNTER
Patient to rete=run call to office. Did inform patient on message that we no longer have a xray machine.

## 2025-05-19 NOTE — TELEPHONE ENCOUNTER
I fell yesterday and hurt my foot and in pain and I want an appt to see the dr Leni hernandez [phone]

## 2025-06-02 DIAGNOSIS — I10 PRIMARY HYPERTENSION: ICD-10-CM

## 2025-06-03 RX ORDER — LOSARTAN POTASSIUM 100 MG/1
100 TABLET ORAL DAILY
Qty: 90 TABLET | Refills: 3 | Status: SHIPPED | OUTPATIENT
Start: 2025-06-03

## 2025-06-03 NOTE — TELEPHONE ENCOUNTER
Last appointment: 12/4/24  Next appointment: 7/14/25  Previous refill encounter(s): 12/4/24 #90 with 1 refill    Requested Prescriptions     Pending Prescriptions Disp Refills    losartan (COZAAR) 100 MG tablet [Pharmacy Med Name: Losartan Potassium 100 MG Oral Tablet] 90 tablet 3     Sig: TAKE 1 TABLET BY MOUTH DAILY         For Pharmacy Admin Tracking Only    Program: Medication Refill  CPA in place:    Recommendation Provided To:   Intervention Detail: New Rx: 1, reason: Patient Preference  Intervention Accepted By:   Gap Closed?:    Time Spent (min): 5

## 2025-07-10 DIAGNOSIS — M54.50 CHRONIC BILATERAL LOW BACK PAIN, UNSPECIFIED WHETHER SCIATICA PRESENT: ICD-10-CM

## 2025-07-10 DIAGNOSIS — F32.A MILD DEPRESSION: ICD-10-CM

## 2025-07-10 DIAGNOSIS — G89.29 CHRONIC BILATERAL LOW BACK PAIN, UNSPECIFIED WHETHER SCIATICA PRESENT: ICD-10-CM

## 2025-07-10 RX ORDER — DULOXETIN HYDROCHLORIDE 60 MG/1
60 CAPSULE, DELAYED RELEASE ORAL DAILY
Qty: 90 CAPSULE | Refills: 1 | Status: SHIPPED | OUTPATIENT
Start: 2025-07-10

## 2025-07-10 RX ORDER — GABAPENTIN 300 MG/1
300 CAPSULE ORAL 3 TIMES DAILY
Qty: 270 CAPSULE | Refills: 0 | Status: SHIPPED | OUTPATIENT
Start: 2025-07-10 | End: 2025-10-08

## 2025-07-10 NOTE — TELEPHONE ENCOUNTER
Pharmacy is wanting to verify if the pt is currently taking 2/day on Cymbalta. I contacted patient, name and  were verified, and confirmed she is taking Cymbalta 60mg/day. I informed her we can change her to the 60mg capsules @ 1/day rather than taking 30mg 2/day. Pt verbalized understanding.    She is also requesting to refill the Neurontin due to back pain she is experiencing. She states Aleva and BC just aren't working for her.    Last appointment: 24  Next appointment: 25  Previous refill encounter(s): 25 Cymbalta 30mg, 3/14/25 Neurontin    Requested Prescriptions     Pending Prescriptions Disp Refills    DULoxetine (CYMBALTA) 60 MG extended release capsule 90 capsule 1     Sig: Take 1 capsule by mouth daily    gabapentin (NEURONTIN) 300 MG capsule 270 capsule 0     Sig: Take 1 capsule by mouth 3 times daily for 90 days. Max Daily Amount: 900 mg         For Pharmacy Admin Tracking Only    Program: Medication Refill  CPA in place:    Recommendation Provided To:   Intervention Detail: New Rx: 2, reason: Patient Preference  Intervention Accepted By:   Gap Closed?:    Time Spent (min): 5

## 2025-08-28 ENCOUNTER — OFFICE VISIT (OUTPATIENT)
Age: 60
End: 2025-08-28

## 2025-08-28 VITALS
BODY MASS INDEX: 36.67 KG/M2 | HEART RATE: 93 BPM | TEMPERATURE: 98 F | OXYGEN SATURATION: 100 % | SYSTOLIC BLOOD PRESSURE: 118 MMHG | DIASTOLIC BLOOD PRESSURE: 83 MMHG | WEIGHT: 207 LBS | RESPIRATION RATE: 16 BRPM

## 2025-08-28 DIAGNOSIS — R49.0 HOARSENESS: ICD-10-CM

## 2025-08-28 DIAGNOSIS — R07.0 THROAT PAIN: Primary | ICD-10-CM

## 2025-08-28 LAB
GROUP A STREP ANTIGEN, POC: NEGATIVE
VALID INTERNAL CONTROL, POC: YES

## 2025-08-28 RX ORDER — METHYLPREDNISOLONE 4 MG/1
TABLET ORAL
Qty: 1 KIT | Refills: 0 | Status: SHIPPED | OUTPATIENT
Start: 2025-08-28 | End: 2025-09-03

## 2025-08-28 ASSESSMENT — ENCOUNTER SYMPTOMS
TROUBLE SWALLOWING: 0
VOICE CHANGE: 1
SHORTNESS OF BREATH: 0
COUGH: 0
SORE THROAT: 1
CHEST TIGHTNESS: 0
RHINORRHEA: 0